# Patient Record
Sex: MALE | Race: WHITE | URBAN - METROPOLITAN AREA
[De-identification: names, ages, dates, MRNs, and addresses within clinical notes are randomized per-mention and may not be internally consistent; named-entity substitution may affect disease eponyms.]

---

## 2018-08-30 ENCOUNTER — OFFICE VISIT (OUTPATIENT)
Dept: CARDIOLOGY CLINIC | Age: 48
End: 2018-08-30

## 2018-08-30 VITALS
HEIGHT: 76 IN | WEIGHT: 315 LBS | SYSTOLIC BLOOD PRESSURE: 170 MMHG | OXYGEN SATURATION: 96 % | BODY MASS INDEX: 38.36 KG/M2 | DIASTOLIC BLOOD PRESSURE: 110 MMHG | HEART RATE: 61 BPM

## 2018-08-30 DIAGNOSIS — Z01.810 PRE-OPERATIVE CARDIOVASCULAR EXAMINATION: Primary | ICD-10-CM

## 2018-08-30 DIAGNOSIS — G47.33 OSA (OBSTRUCTIVE SLEEP APNEA): ICD-10-CM

## 2018-08-30 DIAGNOSIS — I10 ESSENTIAL HYPERTENSION: ICD-10-CM

## 2018-08-30 DIAGNOSIS — R94.31 ABNORMAL EKG: ICD-10-CM

## 2018-08-30 PROCEDURE — 99214 OFFICE O/P EST MOD 30 MIN: CPT | Performed by: INTERNAL MEDICINE

## 2018-08-30 NOTE — PROGRESS NOTES
[address] [facility]   Cardiovascular Evaluation    PATIENT: Kiah Suarez  DATE: 2018  MRN: [id number]  CSN: [id number]  : 1970    Primary Care Doctor: Christos Underwood PA-C    Reason for evaluation/Chief complaint:   New Patient and Cardiac Clearance (kidney surgery)      Subjective:    History of present illness on initial date of evaluation:   Ivonne Nelson is a 50 y.o. patient who presents for pre-operative risk assessment for nephrectomy in the setting of renal cell carcinoma. His echocardiogram from 2015 showed an ejection fraction of 55%. He reports he has lost weight since his last visit in . He remains active and tolerates this well. He has no cardiac complaints. He denies chest pain and does not feel physically limited. He does not exercise regularly though he walks regularly at work. Patient Active Problem List   Diagnosis    Essential hypertension    HLD (hyperlipidemia)    DM (diabetes mellitus) ([address]    JENN (obstructive sleep apnea)    Pre-operative cardiovascular examination    Abnormal EKG         Cardiac Testing: I have reviewed the findings below. EKG:  ECHO:   STRESS TEST:  CATH:  BYPASS:  VASCULAR:    Past Medical History:   has a past medical history of Back pain; Cancer ([address] Diabetes mellitus ([address] GERD (gastroesophageal reflux disease); Hyperlipidemia; and Hypertension. Surgical History:   has a past surgical history that includes hernia repair. Social History:   reports that he quit smoking about 19 years ago. He has never used smokeless tobacco. He reports that he drinks alcohol. He reports that he does not use drugs. Family History:  No evidence for sudden cardiac death or premature CAD    Medications:  Reviewed and are listed in nursing record. and/or listed below  Outpatient Medications:  Prior to Admission medications    Medication Sig Start Date End Date Taking?  Authorizing Provider   Liraglutide (VICTOZA SC) Inject into the skin   Yes Historical Provider, MD   cloNIDine (CATAPRES) 0.2 MG tablet Take 0.2 mg by mouth every evening   Yes Historical Provider, MD   labetalol (NORMODYNE) 300 MG tablet Take 300 mg by mouth 3 times daily    Yes Historical Provider, MD   diltiazem (TIAZAC) 360 MG SR capsule Take 360 mg by mouth daily   Yes Historical Provider, MD   gabapentin (NEURONTIN) 300 MG capsule Take 400 mg by mouth 2 times daily. .   Yes Historical Provider, MD   metFORMIN ER, OSM, (FORTAMET) 1000 MG ER tablet Take 2,000 mg by mouth daily (with breakfast)   Yes Historical Provider, MD   pantoprazole (PROTONIX) 40 MG tablet Take 40 mg by mouth daily   Yes Historical Provider, MD   fenofibrate (TRICOR) 145 MG tablet Take 145 mg by mouth daily   Yes Historical Provider, MD   aspirin 81 MG EC tablet Take 81 mg by mouth daily. Yes Historical Provider, MD   furosemide (LASIX) 40 MG tablet Take 40 mg by mouth daily. Yes Historical Provider, MD   lisinopril (PRINIVIL;ZESTRIL) 40 MG tablet   Take 80 mg by mouth daily    Yes Historical Provider, MD       In-patient schedule medications:        Infusion Medications: Allergies:  Vicodin [hydrocodone-acetaminophen]     Review of Systems:   All 14 point review of symptoms completed. Pertinent positives identified in the HPI, all other review of symptoms findings as below.      Review of Systems - History obtained from the patient  General ROS: negative for - chills, fever or night sweats  Psychological ROS: negative for - disorientation or hallucinations  Ophthalmic ROS: negative for - dry eyes, eye pain or loss of vision  ENT ROS: negative for - nasal discharge or sore throat  Allergy and Immunology ROS: negative for - hives or itchy/watery eyes  Hematological and Lymphatic ROS: negative for - jaundice or night sweats  Endocrine ROS: negative for - mood swings or temperature intolerance  Breast ROS: deferred  Respiratory ROS: negative for - hemoptysis or stridor  Cardiovascular ROS: negative for - chest pain, dyspnea on exertion or palpitations  Gastrointestinal ROS: no abdominal pain, change in bowel habits, or black or bloody stools  Genito-Urinary ROS: no dysuria, trouble voiding, or hematuria  Musculoskeletal ROS: negative for - gait disturbance, joint pain or joint stiffness  Neurological ROS: negative for - seizures or speech problems  Dermatological ROS: negative for - rash or skin lesion changes      Physical Examination:    BP (!) 170/110   Pulse 61   Ht 6' 4\" (1.93 m)   Wt (!) 374 lb (169.6 kg)   SpO2 96%   BMI 45.52 kg/m²   BP (!) 170/110   Pulse 61   Ht 6' 4\" (1.93 m)   Wt (!) 374 lb (169.6 kg)   SpO2 96%   BMI 45.52 kg/m²    Weight: (!) 374 lb (169.6 kg)     Wt Readings from Last 3 Encounters:   08/30/18 (!) 374 lb (169.6 kg)   07/31/15 (!) 401 lb (181.9 kg)   07/21/12 (!) 399 lb (181 kg)     No intake or output data in the 24 hours ending 08/30/18 1232    General Appearance:  Alert, cooperative, no distress, appears stated age   Head:  Normocephalic, without obvious abnormality, atraumatic   Eyes:  PERRL, conjunctiva/corneas clear       Nose: Nares normal, no drainage or sinus tenderness   Throat: Lips, mucosa, and tongue normal   Neck: Supple, symmetrical, trachea midline, no adenopathy, thyroid: not enlarged, symmetric, no tenderness/mass/nodules, no carotid bruit or JVD       Lungs:   Clear to auscultation bilaterally, respirations unlabored   Chest Wall:  No tenderness or deformity   Heart:  Regular rhythm and normal rate; S1, S2 are normal; no murmur noted; no rub or gallop   Abdomen:   Soft, obese, non-tender, bowel sounds active all four quadrants,  no masses, no organomegaly           Extremities: Extremities normal, atraumatic, no cyanosis or edema   Pulses: 2+ and symmetric   Skin: Skin color, texture, turgor normal, no rashes or lesions   Pysch: Normal mood and affect   Neurologic: Normal gross motor and sensory exam.

## 2018-08-30 NOTE — LETTER
[address] [place] [address] Cardiology - [address] [place] [address] [address] [address] [place] [id number]  Phone: [phone]  Fax: [phone]    Yara Fields MD        August 30, 2018     [facility]  [address]      Evin Suarez 50 y.o. male 1970 has no contraindications to undergo his noncardiac surgery. He may proceed at moderate risk without further testing. If you have any questions or concerns, please don't hesitate to call.     Sincerely,        Yara Fields MD

## 2018-09-29 PROBLEM — Z01.810 PRE-OPERATIVE CARDIOVASCULAR EXAMINATION: Status: RESOLVED | Noted: 2018-08-30 | Resolved: 2018-09-29

## 2019-12-12 ENCOUNTER — TELEPHONE (OUTPATIENT)
Dept: BARIATRICS/WEIGHT MGMT | Age: 49
End: 2019-12-12

## 2022-01-04 ENCOUNTER — HOSPITAL ENCOUNTER (OUTPATIENT)
Dept: NURSING | Age: 52
Setting detail: INFUSION SERIES
Discharge: HOME | End: 2022-01-04
Payer: COMMERCIAL

## 2022-01-04 VITALS
OXYGEN SATURATION: 97 % | RESPIRATION RATE: 16 BRPM | HEART RATE: 65 BPM | HEIGHT: 77 IN | WEIGHT: 315 LBS | DIASTOLIC BLOOD PRESSURE: 91 MMHG | BODY MASS INDEX: 37.19 KG/M2 | TEMPERATURE: 97.3 F | SYSTOLIC BLOOD PRESSURE: 132 MMHG

## 2022-01-04 PROCEDURE — M0245 HC IV INFUSION BAMLANIVIMAB & ETESEVIMAB W/MONITORING: HCPCS

## 2022-01-04 PROCEDURE — 2580000003 HC RX 258: Performed by: FAMILY MEDICINE

## 2022-01-04 PROCEDURE — 6360000002 HC RX W HCPCS: Performed by: FAMILY MEDICINE

## 2022-01-04 PROCEDURE — 99203 OFFICE O/P NEW LOW 30 MIN: CPT

## 2022-01-04 PROCEDURE — 2500000003 HC RX 250 WO HCPCS: Performed by: FAMILY MEDICINE

## 2022-01-04 RX ADMIN — SODIUM CHLORIDE: 9 INJECTION, SOLUTION INTRAVENOUS at 08:17

## 2022-01-04 ASSESSMENT — PAIN - FUNCTIONAL ASSESSMENT: PAIN_FUNCTIONAL_ASSESSMENT: 0-10

## 2022-01-04 ASSESSMENT — PAIN SCALES - GENERAL: PAINLEVEL_OUTOF10: 0

## 2022-01-04 NOTE — PLAN OF CARE
Patient here ambulatory for COVID infusion. Patient in stable condition, VSS. Patient was provided verbal education on the medications and he verbalized understanding. Patient tolerated infusion well, discharged back to home, verbalized understanding of written discharge instructions.